# Patient Record
Sex: MALE | Race: WHITE
[De-identification: names, ages, dates, MRNs, and addresses within clinical notes are randomized per-mention and may not be internally consistent; named-entity substitution may affect disease eponyms.]

---

## 2021-03-27 ENCOUNTER — HOSPITAL ENCOUNTER (EMERGENCY)
Dept: HOSPITAL 56 - MW.ED | Age: 36
Discharge: HOME | End: 2021-03-27
Payer: COMMERCIAL

## 2021-03-27 VITALS — DIASTOLIC BLOOD PRESSURE: 72 MMHG | HEART RATE: 75 BPM | SYSTOLIC BLOOD PRESSURE: 127 MMHG

## 2021-03-27 DIAGNOSIS — Z79.899: ICD-10-CM

## 2021-03-27 DIAGNOSIS — K21.9: ICD-10-CM

## 2021-03-27 DIAGNOSIS — Z88.1: ICD-10-CM

## 2021-03-27 DIAGNOSIS — Z91.018: ICD-10-CM

## 2021-03-27 DIAGNOSIS — Z72.0: ICD-10-CM

## 2021-03-27 DIAGNOSIS — K52.9: Primary | ICD-10-CM

## 2021-03-27 DIAGNOSIS — K29.70: ICD-10-CM

## 2021-03-27 DIAGNOSIS — Z88.0: ICD-10-CM

## 2021-03-27 LAB
BUN SERPL-MCNC: 21 MG/DL (ref 7–18)
CHLORIDE SERPL-SCNC: 101 MMOL/L (ref 98–107)
CO2 SERPL-SCNC: 24.3 MMOL/L (ref 21–32)
GLUCOSE SERPL-MCNC: 129 MG/DL (ref 74–106)
LIPASE SERPL-CCNC: 122 U/L (ref 73–393)
POTASSIUM SERPL-SCNC: 3.6 MMOL/L (ref 3.5–5.1)
SODIUM SERPL-SCNC: 138 MMOL/L (ref 136–148)

## 2021-03-27 PROCEDURE — 85730 THROMBOPLASTIN TIME PARTIAL: CPT

## 2021-03-27 PROCEDURE — 84484 ASSAY OF TROPONIN QUANT: CPT

## 2021-03-27 PROCEDURE — 36415 COLL VENOUS BLD VENIPUNCTURE: CPT

## 2021-03-27 PROCEDURE — 81001 URINALYSIS AUTO W/SCOPE: CPT

## 2021-03-27 PROCEDURE — 96375 TX/PRO/DX INJ NEW DRUG ADDON: CPT

## 2021-03-27 PROCEDURE — 85025 COMPLETE CBC W/AUTO DIFF WBC: CPT

## 2021-03-27 PROCEDURE — 96374 THER/PROPH/DIAG INJ IV PUSH: CPT

## 2021-03-27 PROCEDURE — 80053 COMPREHEN METABOLIC PANEL: CPT

## 2021-03-27 PROCEDURE — 85610 PROTHROMBIN TIME: CPT

## 2021-03-27 PROCEDURE — C9113 INJ PANTOPRAZOLE SODIUM, VIA: HCPCS

## 2021-03-27 PROCEDURE — 99284 EMERGENCY DEPT VISIT MOD MDM: CPT

## 2021-03-27 PROCEDURE — 83690 ASSAY OF LIPASE: CPT

## 2021-03-27 NOTE — EDM.PDOC
ED HPI GENERAL MEDICAL PROBLEM





- General


Chief Complaint: Gastrointestinal Problem


Stated Complaint: VOMITING DARK BROWN


Time Seen by Provider: 03/27/21 20:27





- History of Present Illness


INITIAL COMMENTS - FREE TEXT/NARRATIVE: 





HISTORY AND PHYSICAL:





History of present illness:


This is a 36-year-old gentleman with no significant past medical history who 

presents ER today secondary to 2 episodes of coffee-ground emesis today.  

Patient reports that he has had loose stool and tactile fevers and URI symptoms 

for several days.  Patient reports that his first emesis was approximately 11 AM

today and had a second 1 several hours later both with brownish material.  

Patient reports that his mother is a nurse and recommended that he come to the 

ED secondary to possible coffee-ground emesis.  Patient reports that he is a 

 and does a lot of sitting in his car resulting in back pain that 

has required him to utilize a significant amount of ibuprofen over the last 

several months.  Patient denies any recent fevers, shakes, chills.  Patient 

denies any dysuria, frequency, urgency, hematuria.  Patient denies any rash or 

bruising on his body.  Patient denies any easy bleeding from his gums.  Patient 

denies any chest pain or shortness of breath.  Patient reports he does have a 

family history of stomach ulcers with his mother and father.  Patient denies any

melena or bright red blood per rectum.  Patient denies any hematochezia.





Review of systems: 


As per history of present illness and below otherwise all systems reviewed and 

negative.





Past medical history: 


As per history of present illness and as reviewed below otherwise 

noncontributory.





Surgical history: 


As per history of present illness and as reviewed below otherwise 

noncontributory.





Social history: 


No reported history of drug or alcohol abuse.





Family history: 


As per history of present illness and as reviewed below otherwise 

noncontributory.





Physical exam:





This patient was seen and evaluated during the 2020 SARS-CoV-2 novel coronavirus

pandemic period.  Community viral transmission is ongoing at time of this 

encounter and the emergency department is operating under pandemic response 

procedures.





Constitutional: Patient is oriented to person, place, and time.  Appears well-

developed and well-nourished.  No distress.


 HEENT: Moist mucous membranes


 Head: Normocephalic and atraumatic


 Eyes: Right eye exhibits no discharge.  Left eye exhibits no discharge.  No 

scleral icterus


 Neck: Normal range of motion.  No tracheal deviation present.


 Cardiovascular: Normal rate and regular rhythm.


 Pulmonary: Effort normal, no respiratory distress.


Abd:  Soft, nondistended, no rebound/guarding, no psoas or obturator signs, no 

tenderness at Mcberney's point, no Ceja's sign.  Pt does not present with an 

exam that would be consistent with an acute surgical abdomen at this time.  

Nontender to palpation.


Rectal exam: Brown heme-negative stool.


 Musculoskeletal: Normal range of motion


 Neurologic: Alert and oriented to person, place and time.


 Skin: Pink, warm and dry.  


 Psychiatric: Normal mood and affect.  Behavior is normal.  Judgment and thought

content normal.


 Nursing note and vital signs have been reviewed











Diagnostics:


CBC, CMP, lipase all within normal limits


Therapeutics:


NSS x1 L, Protonix 40 mg IV.





Assessment and plan:


This is a 36-year-old gentleman who presents ER today secondary to episodes of 

coffee-ground emesis.  Etiology of this is unclear however most likely secondary

to some bleeding within the gastrum.  I discussed with the patient that this 

might be secondary to erosive gastritis versus ulcer disease versus other 

causes.  Given his normal labs and vital signs.  I feel that the patient is 

stable for discharge home and we will initiate him on a proton pump inhibitor.  

Patient has been instructed to follow-up with his primary care physician to get 

a referral to see a gastroenterologist/surgeon for possible endoscopy.  Patient 

also be given the phone number for the surgery clinic to be evaluated for 

possible endoscopy as well.  I discussed with the patient cutting back 

completely on any NSAIDs and just using acetaminophen for his pain for at least 

4 weeks to help with possible erosive gastritis.  Patient has no risk factors 

for esophageal varices.  





During the course of the patient's evaluation for abdominal pain, kidney stone, 

pancreatitis, cholecystitis, diverticulitis, abdominal aortic aneurysm, 

myocardial infarction, ischemic bowel, ruptured peptic ulcer, ruptured viscus, 

UTI,and appendicitis as well as other causes of abdominal pain have been 

considered.





Reassessment at the time of disposition demonstrates that the patient is in no 

acute distress.  The patient has remained stable throughout the entire ED visit 

and is without objective evidence for acute process requiring urgent 

intervention or hospitalization. The patient is stable for discharge, counseling

is provided as documented above, discussed symptomatic treatment and specific 

conditions for return.





I have spoken with the patient/caregiver and discussed todays findings, in 

addition to providing specific details for the plan of care. Questions are 

answered and there is agreement with the plan.





Definitive disposition and diagnosis as appropriate pending reevaluation and 

review of above.








  ** Upper left abdomen


Pain Score (Numeric/FACES): 3





- Related Data


                                    Allergies











Allergy/AdvReac Type Severity Reaction Status Date / Time


 


cefaclor [From Ceclor] Allergy  Hives Verified 03/27/21 20:17


 


Penicillins Allergy  Hives Verified 03/27/21 20:17


 


strawberry Allergy  Hives Verified 03/27/21 20:17











Home Meds: 


                                    Home Meds





Omeprazole Magnesium [Prilosec Otc] 20 mg PO DAILY #30 tablet. 03/27/21 [Rx]











Past Medical History


Other HEENT History: wears glasses


Cardiovascular History: Reports: None


Respiratory History: Reports: None


Gastrointestinal History: Reports: GERD


Genitourinary History: Reports: None


Musculoskeletal History: Reports: Fracture


Other Musculoskeletal History: ankle and nose


Neurological History: Reports: Migraines


Psychiatric History: Reports: None


Endocrine/Metabolic History: Reports: None


Hematologic History: Reports: None


Immunologic History: Reports: None


Oncologic (Cancer) History: Reports: None


Dermatologic History: Reports: None





- Infectious Disease History


Infectious Disease History: Reports: Chicken Pox





- Past Surgical History


Head Surgeries/Procedures: Reports: None


HEENT Surgical History: Reports: None


Cardiovascular Surgical History: Reports: None


Respiratory Surgical History: Reports: None


GI Surgical History: Reports: Other (See Below)


Other GI Surgeries/Procedures: I & D Pilonidal Cyst


Male  Surgical History: Reports: None


Endocrine Surgical History: Reports: None


Neurological Surgical History: Reports: None


Musculoskeletal Surgical History: Reports: None


Oncologic Surgical History: Reports: None


Dermatological Surgical History: Reports: None





Social & Family History





- Family History


Family Medical History: No Pertinent Family History





- Tobacco Use


Tobacco Use Status *Q: Current Every Day Tobacco User


Years of Tobacco use: 20


Packs/Tins Daily: 1.5





- Caffeine Use


Caffeine Use: Reports: Soda





- Recreational Drug Use


Recreational Drug Use: No





ED ROS GENERAL





- Review of Systems


Review Of Systems: See Below





ED EXAM, GENERAL





- Physical Exam


Exam: See Below





Course





- Vital Signs


Last Recorded V/S: 





                                Last Vital Signs











Temp  97.5 F   03/27/21 20:13


 


Pulse  129 H  03/27/21 20:13


 


Resp  20   03/27/21 20:13


 


BP  132/75   03/27/21 20:13


 


Pulse Ox  96   03/27/21 20:13














- Orders/Labs/Meds


Orders: 





                               Active Orders 24 hr











 Category Date Time Status


 


 Sodium Chloride 0.9% [Saline Flush] Med  03/27/21 20:25 Active





 10 ml FLUSH ASDIRECTED PRN   


 


 Sodium Chloride 0.9% [Saline Flush] Med  03/27/21 20:25 Active





 2.5 ml FLUSH ASDIRECTED PRN   


 


 Saline Lock Insert [OM.PC] Stat Oth  03/27/21 20:25 Ordered








                                Medication Orders





Sodium Chloride (Sodium Chloride 0.9% 10 Ml Syringe)  10 ml FLUSH ASDIRECTED PRN


   PRN Reason: Keep Vein Open


   Last Admin: 03/27/21 20:44  Dose: 10 ml


   Documented by: KATHY


Sodium Chloride (Sodium Chloride 0.9% 2.5 Ml Syringe)  2.5 ml FLUSH ASDIRECTED 

PRN


   PRN Reason: Keep Vein Open


   Last Admin: 03/27/21 20:43  Dose: 2.5 ml


   Documented by: KATHY








Labs: 





                                Laboratory Tests











  03/27/21 03/27/21 03/27/21 Range/Units





  20:31 20:31 20:31 


 


WBC  13.65 H    (4.0-11.0)  K/uL


 


RBC  5.46    (4.50-5.90)  M/uL


 


Hgb  17.5 H    (13.0-17.0)  g/dL


 


Hct  50.6 H    (38.0-50.0)  %


 


MCV  92.7    (80.0-98.0)  fL


 


MCH  32.1 H    (27.0-32.0)  pg


 


MCHC  34.6    (31.0-37.0)  g/dL


 


RDW Std Deviation  43.5    (28.0-62.0)  fl


 


RDW Coeff of Aquiles  13    (11.0-15.0)  %


 


Plt Count  204    (150-400)  K/uL


 


MPV  11.00    (7.40-12.00)  fL


 


Neut % (Auto)  86.7 H    (48.0-80.0)  %


 


Lymph % (Auto)  6.7 L    (16.0-40.0)  %


 


Mono % (Auto)  6.2    (0.0-15.0)  %


 


Eos % (Auto)  0.3    (0.0-7.0)  %


 


Baso % (Auto)  0.1    (0.0-1.5)  %


 


Neut # (Auto)  11.8 H    (1.4-5.7)  K/uL


 


Lymph # (Auto)  0.9    (0.6-2.4)  K/uL


 


Mono # (Auto)  0.8    (0.0-0.8)  K/uL


 


Eos # (Auto)  0.0    (0.0-0.7)  K/uL


 


Baso # (Auto)  0.0    (0.0-0.1)  K/uL


 


Nucleated RBC %  0.0    /100WBC


 


Nucleated RBCs #  0    K/uL


 


INR   1.18   


 


APTT   27.0   (18.6-31.3)  SEC


 


Sodium    138  (136-148)  mmol/L


 


Potassium    3.6  (3.5-5.1)  mmol/L


 


Chloride    101  ()  mmol/L


 


Carbon Dioxide    24.3  (21.0-32.0)  mmol/L


 


BUN    21 H  (7.0-18.0)  mg/dL


 


Creatinine    1.2  (0.8-1.3)  mg/dL


 


Est Cr Clr Drug Dosing    100.73  mL/min


 


Estimated GFR (MDRD)    > 60.0  ml/min


 


Glucose    129 H  ()  mg/dL


 


Calcium    8.2 L  (8.5-10.1)  mg/dL


 


Total Bilirubin    0.6  (0.2-1.0)  mg/dL


 


AST    12 L  (15-37)  IU/L


 


ALT    19  (14-63)  IU/L


 


Alkaline Phosphatase    96  ()  U/L


 


Troponin I    < 0.050  (0.000-0.056)  ng/mL


 


Total Protein    7.3  (6.4-8.2)  g/dL


 


Albumin    3.8  (3.4-5.0)  g/dL


 


Globulin    3.5  (2.6-4.0)  g/dL


 


Albumin/Globulin Ratio    1.1  (0.9-1.6)  


 


Lipase    122  ()  U/L


 


Urine Color     


 


Urine Appearance     


 


Urine pH     (5.0-8.0)  


 


Ur Specific Gravity     (1.001-1.035)  


 


Urine Protein     (NEGATIVE)  mg/dL


 


Urine Glucose (UA)     (NEGATIVE)  mg/dL


 


Urine Ketones     (NEGATIVE)  mg/dL


 


Urine Occult Blood     (NEGATIVE)  


 


Urine Nitrite     (NEGATIVE)  


 


Urine Bilirubin     (NEGATIVE)  


 


Urine Urobilinogen     (<2.0)  EU/dL


 


Ur Leukocyte Esterase     (NEGATIVE)  


 


Urine RBC     (0-2/HPF)  


 


Urine WBC     (0-5/HPF)  


 


Ur Epithelial Cells     (NONE-FEW)  


 


Urine Bacteria     (NEGATIVE)  


 


Urine Mucus     (NONE-MOD)  














  03/27/21 Range/Units





  21:10 


 


WBC   (4.0-11.0)  K/uL


 


RBC   (4.50-5.90)  M/uL


 


Hgb   (13.0-17.0)  g/dL


 


Hct   (38.0-50.0)  %


 


MCV   (80.0-98.0)  fL


 


MCH   (27.0-32.0)  pg


 


MCHC   (31.0-37.0)  g/dL


 


RDW Std Deviation   (28.0-62.0)  fl


 


RDW Coeff of Aquiles   (11.0-15.0)  %


 


Plt Count   (150-400)  K/uL


 


MPV   (7.40-12.00)  fL


 


Neut % (Auto)   (48.0-80.0)  %


 


Lymph % (Auto)   (16.0-40.0)  %


 


Mono % (Auto)   (0.0-15.0)  %


 


Eos % (Auto)   (0.0-7.0)  %


 


Baso % (Auto)   (0.0-1.5)  %


 


Neut # (Auto)   (1.4-5.7)  K/uL


 


Lymph # (Auto)   (0.6-2.4)  K/uL


 


Mono # (Auto)   (0.0-0.8)  K/uL


 


Eos # (Auto)   (0.0-0.7)  K/uL


 


Baso # (Auto)   (0.0-0.1)  K/uL


 


Nucleated RBC %   /100WBC


 


Nucleated RBCs #   K/uL


 


INR   


 


APTT   (18.6-31.3)  SEC


 


Sodium   (136-148)  mmol/L


 


Potassium   (3.5-5.1)  mmol/L


 


Chloride   ()  mmol/L


 


Carbon Dioxide   (21.0-32.0)  mmol/L


 


BUN   (7.0-18.0)  mg/dL


 


Creatinine   (0.8-1.3)  mg/dL


 


Est Cr Clr Drug Dosing   mL/min


 


Estimated GFR (MDRD)   ml/min


 


Glucose   ()  mg/dL


 


Calcium   (8.5-10.1)  mg/dL


 


Total Bilirubin   (0.2-1.0)  mg/dL


 


AST   (15-37)  IU/L


 


ALT   (14-63)  IU/L


 


Alkaline Phosphatase   ()  U/L


 


Troponin I   (0.000-0.056)  ng/mL


 


Total Protein   (6.4-8.2)  g/dL


 


Albumin   (3.4-5.0)  g/dL


 


Globulin   (2.6-4.0)  g/dL


 


Albumin/Globulin Ratio   (0.9-1.6)  


 


Lipase   ()  U/L


 


Urine Color  DARK YELLOW  


 


Urine Appearance  CLEAR  


 


Urine pH  6.0  (5.0-8.0)  


 


Ur Specific Gravity  >= 1.030  (1.001-1.035)  


 


Urine Protein  TRACE H  (NEGATIVE)  mg/dL


 


Urine Glucose (UA)  NEGATIVE  (NEGATIVE)  mg/dL


 


Urine Ketones  TRACE H  (NEGATIVE)  mg/dL


 


Urine Occult Blood  NEGATIVE  (NEGATIVE)  


 


Urine Nitrite  NEGATIVE  (NEGATIVE)  


 


Urine Bilirubin  NEGATIVE  (NEGATIVE)  


 


Urine Urobilinogen  0.2  (<2.0)  EU/dL


 


Ur Leukocyte Esterase  NEGATIVE  (NEGATIVE)  


 


Urine RBC  NONE SEEN  (0-2/HPF)  


 


Urine WBC  0-1  (0-5/HPF)  


 


Ur Epithelial Cells  RARE  (NONE-FEW)  


 


Urine Bacteria  FEW  (NEGATIVE)  


 


Urine Mucus  LIGHT  (NONE-MOD)  











Meds: 





Medications











Generic Name Dose Route Start Last Admin





  Trade Name Fregerardo  PRN Reason Stop Dose Admin


 


Sodium Chloride  10 ml  03/27/21 20:25  03/27/21 20:44





  Sodium Chloride 0.9% 10 Ml Syringe  FLUSH   10 ml





  ASDIRECTED PRN   Administration





  Keep Vein Open  


 


Sodium Chloride  2.5 ml  03/27/21 20:25  03/27/21 20:43





  Sodium Chloride 0.9% 2.5 Ml Syringe  FLUSH   2.5 ml





  ASDIRECTED PRN   Administration





  Keep Vein Open  














Discontinued Medications














Generic Name Dose Route Start Last Admin





  Trade Name Freq  PRN Reason Stop Dose Admin


 


Sodium Chloride  1,000 mls @ 999 mls/hr  03/27/21 20:25  03/27/21 20:40





  Normal Saline  IV  03/27/21 21:25  999 mls/hr





  .Bolus ONE   Administration


 


Pantoprazole Sodium 40 mg/  10 mls @ 300 mls/hr  03/27/21 20:25  03/27/21 20:41





  Sodium Chloride  IV  03/27/21 20:26  300 mls/hr





  NOW ONE   Administration


 


Ondansetron HCl  4 mg  03/27/21 20:25  03/27/21 20:44





  Ondansetron 4 Mg/2 Ml Sdv  IVPUSH  03/27/21 20:26  4 mg





  ONETIME ONE   Administration














Departure





- Departure


Time of Disposition: 21:46


Disposition: Home, Self-Care 01


Condition: Good


Clinical Impression: 


 Erosive gastritis, Coffee ground emesis, Vomiting and diarrhea, Gastroenteritis








- Discharge Information


Instructions:  Hematemesis, Gastritis, Adult, Nausea and Vomiting, Adult, 

Easy-to-Read, Peptic Ulcer


Referrals: 


PCP,None [Primary Care Provider] - 


Additional Instructions: 


You were seen and evaluated in ER today secondary to 2 episodes of what appears 

to have been coffee-ground emesis by your description.  All your blood tests 

including your liver tests, bleeding test, hemoglobin level are all within 

normal limits.  Your symptoms are most likely secondary to irritation within 

your stomach from an erosive gastritis versus early stomach ulcer.  You will be 

started on a proton pump inhibitor to take called Prilosec.  Please take that 

daily.  Please make an appointment to see your family doctor or our surgery 

clinic for an outpatient endoscopy if your symptoms persist.





[facility] - General Surgery


[address], [address]


[place] [address]


Phone: [phone]








The following information is given to patients seen in the emergency department 

who are being discharged to home. This information is to outline your options 

for follow-up care. We provide all patients seen in our emergency department 

with a follow-up referral.





The need for follow-up, as well as the timing and circumstances, are variable 

depending upon the specifics of your emergency department visit.





If you don't have a primary care physician on staff, we will provide you with a 

referral. We always advise you to contact your personal physician following an 

emergency department visit to inform them of the circumstance of the visit and 

for follow-up with them and/or the need for any referrals to a consulting 

specialist.





The emergency department will also refer you to a specialist when appropriate. 

This referral assures that you have the opportunity for follow-up care with a 

specialist. All of these measure are taken in an effort to provide you with 

optimal care, which includes your follow-up.





Under all circumstances we always encourage you to contact your private 

physician who remains a resource for coordinating your care. When calling for 

follow-up care, please make the office aware that this follow-up is from your 

recent emergency room visit. If for any reason you are refused follow-up, please

contact the [facility] Emergency Department

at [phone] and asked to speak to the emergency department charge nurse.





[facility] - Primary Care


[address] [place] [address]


Phone: [phone]


Fax: [phone]








[facility]


[address] [place] [address]


Phone: [phone]


Fax: [phone]








Sepsis Event Note (ED)





- Evaluation


Sepsis Screening Result: No Definite Risk





- Focused Exam


Vital Signs: 





                                   Vital Signs











  Temp Pulse Resp BP Pulse Ox


 


 03/27/21 20:13  97.5 F  129 H  20  132/75  96














- My Orders


Last 24 Hours: 





My Active Orders





03/27/21 20:25


Sodium Chloride 0.9% [Saline Flush]   10 ml FLUSH ASDIRECTED PRN 


Sodium Chloride 0.9% [Saline Flush]   2.5 ml FLUSH ASDIRECTED PRN 


Saline Lock Insert [OM.PC] Stat 














- Assessment/Plan


Last 24 Hours: 





My Active Orders





03/27/21 20:25


Sodium Chloride 0.9% [Saline Flush]   10 ml FLUSH ASDIRECTED PRN 


Sodium Chloride 0.9% [Saline Flush]   2.5 ml FLUSH ASDIRECTED PRN 


Saline Lock Insert [OM.PC] Stat